# Patient Record
(demographics unavailable — no encounter records)

---

## 2024-10-24 NOTE — PHYSICAL EXAM
[Ankle Swelling Bilaterally] : bilaterally  [Varicose Veins Of Lower Extremities] : bilaterally [Ankle Swelling On The Left] : moderate [1+] : right foot dorsalis pedis 1+ [2+] : left foot dorsalis pedis 2+ [Deep Tendon Reflexes (DTR)] : deep tendon reflexes were 2+ and symmetric [Motor Exam] : the motor exam was normal [Ankle Swelling (On Exam)] : not present [FreeTextEntry3] : CFT: Delayed. Temperature gradient: warm to cool. CFT: Delayed. Temperature gradient: warm to cool.   CFT: Delayed. Temperature gradient: warm to cool.   [de-identified] : Muscle strength is 5/5. There is negative pain at this time. He does have bilateral hallux 1st MPJ limited ROM and hallux limitus is present. Negative pain or calor at this time. Bilateral hallux nails are thickened, elongated with subungual debris. There is an incurvated nail border on the fibular aspect of the right hallux nail with localized erythema to the area and residual mild erythema, edema locally to that area. Negative signs of ascending cellulitis.  Negative purulence, drainage, pain. All the remaining nails are elongated with negative signs of infection, drainage, paronychia or purulence. [FreeTextEntry1] : Negative edema, erythema or interdigital macerations. Bilateral hallux nails are elongated, thickened with subungual debris and discoloration. Incurvating nail borders and mild erythema to the distal aspects of the medial nail fold. There is ingrowing spicule of nail visualized with no gross infection, purulence, drainage, paronychia or granulomas. The remainder of nails are elongated, dystrophic but no signs of clinical infection. There is negative hyperkeratotic lesions plantarly. Negative preulcerative lesions, breaks in skin or skin fissuring at this time. [FreeTextEntry4] :   decreased vibratory at the 1st MPJ [FreeTextEntry8] : decreased vibratory at the 1st MPJ

## 2024-10-24 NOTE — HISTORY OF PRESENT ILLNESS
[FreeTextEntry1] : Patient presents today for elongated, thickened, mycotic nails, which he states he cannot cut himself. The patient has recurrent history of ingrowing nails bilateral hallux. He states he is doing much better. He has not had any issues with the ingrowing nails since his last appointment and the previous infections have resolved. He denies any other changes in his medical conditions or medications. Overall he states he is doing better.

## 2024-10-24 NOTE — PHYSICAL EXAM
[Ankle Swelling Bilaterally] : bilaterally  [Varicose Veins Of Lower Extremities] : bilaterally [Ankle Swelling On The Left] : moderate [1+] : right foot dorsalis pedis 1+ [2+] : left foot dorsalis pedis 2+ [Deep Tendon Reflexes (DTR)] : deep tendon reflexes were 2+ and symmetric [Motor Exam] : the motor exam was normal [Ankle Swelling (On Exam)] : not present [FreeTextEntry3] : CFT: Delayed. Temperature gradient: warm to cool. CFT: Delayed. Temperature gradient: warm to cool.   CFT: Delayed. Temperature gradient: warm to cool.   [de-identified] : Muscle strength is 5/5. There is negative pain at this time. He does have bilateral hallux 1st MPJ limited ROM and hallux limitus is present. Negative pain or calor at this time. Bilateral hallux nails are thickened, elongated with subungual debris. There is an incurvated nail border on the fibular aspect of the right hallux nail with localized erythema to the area and residual mild erythema, edema locally to that area. Negative signs of ascending cellulitis.  Negative purulence, drainage, pain. All the remaining nails are elongated with negative signs of infection, drainage, paronychia or purulence. [FreeTextEntry1] : Negative edema, erythema or interdigital macerations. Bilateral hallux nails are elongated, thickened with subungual debris and discoloration. Incurvating nail borders and mild erythema to the distal aspects of the medial nail fold. There is ingrowing spicule of nail visualized with no gross infection, purulence, drainage, paronychia or granulomas. The remainder of nails are elongated, dystrophic but no signs of clinical infection. There is negative hyperkeratotic lesions plantarly. Negative preulcerative lesions, breaks in skin or skin fissuring at this time. [FreeTextEntry4] :   decreased vibratory at the 1st MPJ [FreeTextEntry8] : decreased vibratory at the 1st MPJ

## 2024-10-24 NOTE — PHYSICAL EXAM
[Ankle Swelling Bilaterally] : bilaterally  [Varicose Veins Of Lower Extremities] : bilaterally [Ankle Swelling On The Left] : moderate [1+] : right foot dorsalis pedis 1+ [2+] : left foot dorsalis pedis 2+ [Deep Tendon Reflexes (DTR)] : deep tendon reflexes were 2+ and symmetric [Motor Exam] : the motor exam was normal [Ankle Swelling (On Exam)] : not present [FreeTextEntry3] : CFT: Delayed. Temperature gradient: warm to cool. CFT: Delayed. Temperature gradient: warm to cool.   CFT: Delayed. Temperature gradient: warm to cool.   [de-identified] : Muscle strength is 5/5. There is negative pain at this time. He does have bilateral hallux 1st MPJ limited ROM and hallux limitus is present. Negative pain or calor at this time. Bilateral hallux nails are thickened, elongated with subungual debris. There is an incurvated nail border on the fibular aspect of the right hallux nail with localized erythema to the area and residual mild erythema, edema locally to that area. Negative signs of ascending cellulitis.  Negative purulence, drainage, pain. All the remaining nails are elongated with negative signs of infection, drainage, paronychia or purulence. [FreeTextEntry1] : Negative edema, erythema or interdigital macerations. Bilateral hallux nails are elongated, thickened with subungual debris and discoloration. Incurvating nail borders and mild erythema to the distal aspects of the medial nail fold. There is ingrowing spicule of nail visualized with no gross infection, purulence, drainage, paronychia or granulomas. The remainder of nails are elongated, dystrophic but no signs of clinical infection. There is negative hyperkeratotic lesions plantarly. Negative preulcerative lesions, breaks in skin or skin fissuring at this time. [FreeTextEntry4] :   decreased vibratory at the 1st MPJ [FreeTextEntry8] : decreased vibratory at the 1st MPJ

## 2024-10-24 NOTE — ASSESSMENT
[FreeTextEntry1] : Impression: Ingrown nail. Onychomycosis. Vascular insufficiency. Pain in toes.  Treatment: Discussed findings and conditions with the patient. Bilateral hallux nails were prepped and the offending portions of nails were removed via distal slant backs. Subungual debris was curettaged from the area. There was residual nail noted in the distal bilateral aspect of the right hallux nail fold, which was gently curettaged and removed without incident. The area was flushed with normal saline. Antibiotic ointment was applied to the area. He is to continue Neosporin to the area for the next 2 -3 days, prophylactically. With any increase in redness, drainage, pain, problems or concerns, patient is to contact the office immediately The remainder of the nails were prepped and trimmed to appropriate hygienic length. Return 2 - 3 months.

## 2025-01-21 NOTE — PHYSICAL EXAM
[Varicose Veins Of Lower Extremities] : bilaterally [Ankle Swelling On The Left] : moderate [1+] : right foot dorsalis pedis 1+ [2+] : left foot dorsalis pedis 2+ [Sensation] : the sensory exam was normal to light touch and pinprick [No Focal Deficits] : no focal deficits [Deep Tendon Reflexes (DTR)] : deep tendon reflexes were 2+ and symmetric [Motor Exam] : the motor exam was normal [Ankle Swelling (On Exam)] : not present [FreeTextEntry3] : CFT: Delayed.  Temperature gradient: warm to cool.   [de-identified] : Muscle strength is 5/5. There is negative pain at this time. He does have bilateral hallux 1st MPJ limited ROM and hallux limitus is present. Negative pain or calor at this time.  Bilateral hallux nails are thickened, elongated with subungual debris.  There is an incurvated nail border on the fibular aspect of the right hallux nail with localized erythema to the area and residual mild erythema, edema locally to that area.  Negative signs of ascending cellulitis.   Negative purulence, drainage, pain.  All the remaining nails are elongated with negative signs of infection, drainage, paronychia or purulence.  [FreeTextEntry1] : There is thin skin, atrophic skin with negative hair growth.  Negative edema, erythema or interdigital macerations. Negative signs of preulcerative hyperkeratotic lesions, plantarly.  Mild hyperkeratotic lesion on the medial and plantar aspect of the 1st MPJ, left foot but no breaks in skin or skin fissuring.  Bilateral hallux nails are elongated, thickened with subungual debris and discoloration.  Incurvating nail borders with negative erythema to the lateral aspects of the nail folds, bilateral.   Ingrowing nail visualized with pain on direct palpation and examination of the right hallux, distal fibular nail border but no purulence or drainage.  No granulomas.  The remainder of the nails are elongated and dystrophic.  No signs of acute bacterial infections.   [FreeTextEntry4] : decreased vibratory at the 1st MPJ. [FreeTextEntry8] : decreased vibratory at the 1st MPJ.

## 2025-01-21 NOTE — ASSESSMENT
[FreeTextEntry1] : Impression: Painful onychomycosis (B35.1).  Ingrown toenails (L60.0).  Bilateral foot pain (M79.674, M79.675).  Vascular insufficiency (I99.8).  Onychodystrophy (L60.3).  Treatment: Patient is to avoid cutting the nails himself as it may cause increased risk of infection.  Patient states that he understands.  Bilateral mycotic hallux nails were prepped and the offending portions of nails were removed via distal slant backs.  Subungual debris was curettaged from the area.  The area was flushed with normal saline.  Antibiotic ointment was applied to the area.  He is to continue Neosporin to the area for the next 2 -3 days, prophylactically.  With any increase in redness, drainage, pain, problems or concerns, patient is to contact the office immediately The remainder of the dystrophic nails were prepped and trimmed to appropriate hygienic length to prevent any risk of infection.  Return: 3 months.  With any redness, pain, problems or concerns, patient is to contact the office.

## 2025-01-21 NOTE — HISTORY OF PRESENT ILLNESS
[FreeTextEntry1] : Patient presents today for cutting of elongated, thickened nails, which he cannot cut himself.  He suffers from back issues and pain and is coming off Prednisone due to excessive pain.  He was in the hospital.  Patient states that overall, he is doing better as of the past several days.  He has a history of ingrowing nails of bilateral hallux, especially in the distal fibular nail borders, right greater than left.  He has discomfort especially in shoe gear.  He is wearing [employer] sneakers today.  He can ambulate without assistance.

## 2025-01-21 NOTE — PHYSICAL EXAM
[Varicose Veins Of Lower Extremities] : bilaterally [Ankle Swelling On The Left] : moderate [1+] : right foot dorsalis pedis 1+ [2+] : left foot dorsalis pedis 2+ [Sensation] : the sensory exam was normal to light touch and pinprick [No Focal Deficits] : no focal deficits [Deep Tendon Reflexes (DTR)] : deep tendon reflexes were 2+ and symmetric [Motor Exam] : the motor exam was normal [Ankle Swelling (On Exam)] : not present [FreeTextEntry3] : CFT: Delayed.  Temperature gradient: warm to cool.   [de-identified] : Muscle strength is 5/5. There is negative pain at this time. He does have bilateral hallux 1st MPJ limited ROM and hallux limitus is present. Negative pain or calor at this time.  Bilateral hallux nails are thickened, elongated with subungual debris.  There is an incurvated nail border on the fibular aspect of the right hallux nail with localized erythema to the area and residual mild erythema, edema locally to that area.  Negative signs of ascending cellulitis.   Negative purulence, drainage, pain.  All the remaining nails are elongated with negative signs of infection, drainage, paronychia or purulence.  [FreeTextEntry1] : There is thin skin, atrophic skin with negative hair growth.  Negative edema, erythema or interdigital macerations. Negative signs of preulcerative hyperkeratotic lesions, plantarly.  Mild hyperkeratotic lesion on the medial and plantar aspect of the 1st MPJ, left foot but no breaks in skin or skin fissuring.  Bilateral hallux nails are elongated, thickened with subungual debris and discoloration.  Incurvating nail borders with negative erythema to the lateral aspects of the nail folds, bilateral.   Ingrowing nail visualized with pain on direct palpation and examination of the right hallux, distal fibular nail border but no purulence or drainage.  No granulomas.  The remainder of the nails are elongated and dystrophic.  No signs of acute bacterial infections.   [FreeTextEntry4] : decreased vibratory at the 1st MPJ. [FreeTextEntry8] : decreased vibratory at the 1st MPJ.